# Patient Record
Sex: MALE | Race: WHITE | Employment: FULL TIME | URBAN - METROPOLITAN AREA
[De-identification: names, ages, dates, MRNs, and addresses within clinical notes are randomized per-mention and may not be internally consistent; named-entity substitution may affect disease eponyms.]

---

## 2022-02-15 ENCOUNTER — HOSPITAL ENCOUNTER (OUTPATIENT)
Dept: PHYSICAL THERAPY | Age: 67
Discharge: HOME | End: 2022-02-15
Payer: MEDICARE

## 2022-02-15 PROCEDURE — 97110 THERAPEUTIC EXERCISES: CPT

## 2022-02-15 PROCEDURE — 97161 PT EVAL LOW COMPLEX 20 MIN: CPT

## 2022-02-15 NOTE — PROGRESS NOTES
PHYSICAL THERAPY - DAILY TREATMENT NOTE    Patient Name: Saskia Medina        Date: 2/15/2022  : 1955    Patient  Verified: YES  Visit #:     Insurance: Payor: Cash Lehman / Plan: [place] MED[place] PART [place] & B / Product Type: Medicare /      In time: 3:00 Out time: 4:00   Total Treatment Time: 60     Medicare Time Tracking (below)   Total Timed Codes (min):  10 1:1 Treatment Time:  10     TREATMENT AREA/ DIAGNOSIS = Neck pain [M54.2]    SUBJECTIVE   Pain Level (on 0 to 10 scale):  1  / 10   Medication Changes/New allergies or changes in medical history, any new surgeries or procedures?     NO    If yes, update Summary List   Subjective Functional Status/Changes:  []  No changes reported     See Eval      OBJECTIVE  Modalities Rationale:     decrease inflammation and decrease pain to improve patient's ability to perform ADLs without pain   min [] Estim, type/location:                                      []  att     []  unatt     []  w/US     []  w/ice    []  w/heat    min []  Mechanical Traction: type/lbs                   []  pro   []  sup   []  int   []  cont    []  before manual    []  after manual    min []  Ultrasound, settings/location:      min []  Iontophoresis w/ dexamethasone, location:                                               []  take home patch       []  in clinic   10 min []  Ice     [x]  Heat    location/position: c/s    min []  Vasopneumatic Device, press/temp:    If using vaso (only need to measure limb vaso being performed on)      pre-treatment girth :       post-treatment girth :       measured at (landmark location) :      min []  Other:    [] Skin assessment post-treatment (if applicable):    []  intact    []  redness- no adverse reaction                  []redness  adverse reaction:        10 min Therapeutic Exercise:  [x]  See flow sheet   Rationale:      increase ROM and increase strength to improve the patients ability to perform ADLs without pain     Billed With/As:   [x] TE   [] TA   [] Neuro   [] Self Care Patient Education: [x] Review HEP    [] Progressed/Changed HEP based on:   [] positioning   [] body mechanics   [] transfers   [] heat/ice application    [] other:        Other Objective/Functional Measures:    See Eval   Post Treatment Pain Level (on 0 to 10) scale:   1  / 10     ASSESSMENT  Assessment/Changes in Function:     See Eval     []  See Progress Note/Recertification   Patient will continue to benefit from skilled PT services to modify and progress therapeutic interventions, address functional mobility deficits, address ROM deficits, address strength deficits, analyze and address soft tissue restrictions and analyze and cue movement patterns to attain remaining goals.    Progress toward goals / Updated goals:    See Eval     PLAN  [x]  Upgrade activities as tolerated YES Continue plan of care   []  Discharge due to :    []  Other:      Therapist: Rae Wright DPT     Date: 2/15/2022 Time: 3:58 PM        Future Appointments   Date Time Provider Natalie Russell   2/21/2022  5:15 PM Ivory Charity [facility]   2/23/2022  5:15 PM Ivory Charity [facility]   2/28/2022  5:15 PM Ivory Charity [facility]   3/3/2022  4:30 PM Georgianne Calandra, PTA [facility]   3/7/2022  5:15 PM Ivory Charity [facility]   3/10/2022  5:15 PM Georgianne Calandra, PTA [facility]   3/14/2022  5:15 PM Ivory Charity [facility]   3/17/2022  5:15 PM Georgianne Calandra, PTA [facility]   3/21/2022  5:15 PM Ivory Charity [facility]   3/24/2022  5:15 PM Alma Delia Feliciano PTA [facility]   3/29/2022  [facility]   3/31/2022  5:15 PM [facility]

## 2022-02-15 NOTE — PROGRESS NOTES
[facility] AT [address], [address] [place] [address], [address] [facility] [address] - Phone: [phone]  Fax: [phone] / [facility]  Patient Name: Cintia Bella : 1955   Treatment   Diagnosis: Neck pain Medical   Diagnosis: Neck pain [M54.2]   Onset Date: 2020     Referral Source: Mohan Lopez [place] [facility]): 2/15/2022   Prior Hospitalization: See medical history Provider #: [id number]   Prior Level of Function: Pt was pain free with ADLs   Comorbidities: Pt reports heart disease, diabetes, high blood pressure   Medications: Verified on Patient Summary List   The Plan of Care and following information is based on the information from the initial evaluation.   ==================================================================================  Assessment / key information:  Pt is a 78 yo male the present to PT with chief complaint of neck pain that initially began in 2020 but resolved completely after undergoing lidocaine injections. Pt reports the same pain recently occurred again in early 2022 with no known mechanism of injury. Pt .  He/she presents with pain ranging from 1-10/10, located on L side of the c/s into the L scapula. Pain is made worse with looking up and looking to the R, better with heat and rest. C/S AROM: flexion 60deg, extension 27deg, LSB 10deg, RSB 10deg, LRotation 36deg RRotation 50deg. Palpation reveals TTP to L side paraspinals, levator, and upper trap. Posture significant kyphosis and forward head posture. GHJ AROM is WFL. Significant hypomobility with PA segmental motion testing of the T/S. Patient scored 46 on FOTO indicating decreased functional activity level and QOL Pt will benefit from PT interventions to address the aforementioned deficits and allow pt to return to PLOF.   Eval Complexity: History LOW Complexity : Zero comorbidities / personal factors that will impact the outcome / POC;  Examination  LOW Complexity : 1-2 Standardized tests and measures addressing body structure, function, activity limitation and / or participation in recreation ; Presentation LOW Complexity : Stable, uncomplicated ;  Decision Making MEDIUM Complexity : FOTO score of 26-74; Overall Complexity LOW     ==================================================================================  Problem List: pain affecting function, decrease ROM, decrease strength, edema affecting function and impaired gait/ balance   Treatment Plan may include any combination of the following: Therapeutic exercise, Therapeutic activities, Neuromuscular re-education, Physical agent/modality, Manual therapy, Patient education, Self Care training and Functional mobility training  Patient / Family readiness to learn indicated by: asking questions, trying to perform skills and interest  Persons(s) to be included in education: patient (P)  Barriers to Learning/Limitations: no  Measures taken, if barriers to learning:    Patient Goal (s): \"no pain\"   Patient self reported health status: fair  Rehabilitation Potential: good   Short Term Goals: To be accomplished in  3  weeks:  1. Pt will be independent and compliant with HEP to decrease pain, increase ROM and return pt to PLOF. 2. Pt will demonstrate a GROC score of >/= +2 to show overall improvement in function  3.   Long Term Goals: To be accomplished in  6  weeks:  1. Increase score on FOTO to > or = 70 points to demo increase in functional activities. 2. Pt will demonstrate a GROC score of >/= +5 to show overall improvement in function  3. Pt will note < or = 2/10 pain with all mobility to improve comfort with ADLs.    Frequency / Duration:   Patient to be seen  2  times per week for 6-8  weeks:  Patient / Caregiver education and instruction: self care, activity modification and exercises  Therapist Signature: Laurent Evans PT Date: 4/76/7285   Certification Period: 2/15/2022-5/15/2022 Time: 3:58 PM   ===========================================================================================  I certify that the above Physical Therapy Services are being furnished while the patient is under my care. I agree with the treatment plan and certify that this therapy is necessary. Physician Signature:        Date:       Time:        Rajan Nam PA-C    Please sign and return to [facility] or you may fax the signed copy to [phone]. Thank you.

## 2022-02-23 ENCOUNTER — HOSPITAL ENCOUNTER (OUTPATIENT)
Dept: PHYSICAL THERAPY | Age: 67
Discharge: HOME | End: 2022-02-23
Payer: MEDICARE

## 2022-02-23 PROCEDURE — 97110 THERAPEUTIC EXERCISES: CPT

## 2022-02-23 PROCEDURE — 97140 MANUAL THERAPY 1/> REGIONS: CPT

## 2022-02-23 PROCEDURE — 97112 NEUROMUSCULAR REEDUCATION: CPT

## 2022-02-23 NOTE — PROGRESS NOTES
PHYSICAL THERAPY - DAILY TREATMENT NOTE    Patient Name: Pamela Mayo        Date: 2022  : 1955    Patient  Verified: YES  Visit #:   2   of   12  Insurance: Payor: Josechino Delgado / Plan: [place] MEDICARE PART A & B / Product Type: Medicare /      In time: 5:15 Out time: 6:05   Total Treatment Time: 50     Medicare Time Tracking (below)   Total Timed Codes (min):  40 1:1 Treatment Time:  40     TREATMENT AREA/ DIAGNOSIS = Neck pain [M54.2]    SUBJECTIVE   Pain Level (on 0 to 10 scale):  2  / 10   Medication Changes/New allergies or changes in medical history, any new surgeries or procedures? NO    If yes, update Summary List   Subjective Functional Status/Changes:  []  No changes reported     Pt reports having pain with performing the neck stretches on the L side of his neck.            OBJECTIVE  Modalities Rationale:     decrease pain and increase tissue extensibility to improve patient's ability to perform ADLs without pain   min [] Estim, type/location:                                      []  att     []  unatt     []  w/US     []  w/ice    []  w/heat    min []  Mechanical Traction: type/lbs                   []  pro   []  sup   []  int   []  cont    []  before manual    []  after manual    min []  Ultrasound, settings/location:      min []  Iontophoresis w/ dexamethasone, location:                                               []  take home patch       []  in clinic   10 min []  Ice     [x]  Heat    location/position: c/s    min []  Vasopneumatic Device, press/temp:    If using vaso (only need to measure limb vaso being performed on)      pre-treatment girth :       post-treatment girth :       measured at (landmark location) :      min []  Other:    [] Skin assessment post-treatment (if applicable):    []  intact    []  redness- no adverse reaction                  []redness  adverse reaction:        15 min Therapeutic Exercise:  [x]  See flow sheet   Rationale:      increase ROM and increase strength to improve the patients ability to perform ADLs without pain     10 min Manual Therapy: STM to c/s. Manual traction   Rationale:      decrease pain, increase ROM and increase tissue extensibility to improve patient's ability to perform ADLs without pain  The manual therapy interventions were performed at a separate and distinct time from the therapeutic activities interventions      15 min Neuromuscular Re-ed: [x]  See flow sheet   Rationale:    improve coordination and increase proprioception to improve the patients ability to perform ADLs without pain    Billed With/As:   [x] TE   [] TA   [] Neuro   [] Self Care Patient Education: [x] Review HEP    [] Progressed/Changed HEP based on:   [] positioning   [] body mechanics   [] transfers   [] heat/ice application    [] other:        Other Objective/Functional Measures:    Was able to find MRI results in [facility] and shows multiple disc bulges at multiple levels. Post Treatment Pain Level (on 0 to 10) scale:   2  / 10     ASSESSMENT  Assessment/Changes in Function:     Pt showing significant kyphosis and forward head posture with sitting. Difficulty with getting out of that position     []  See Progress Note/Recertification   Patient will continue to benefit from skilled PT services to modify and progress therapeutic interventions, address functional mobility deficits, address ROM deficits, address strength deficits, analyze and address soft tissue restrictions and analyze and cue movement patterns to attain remaining goals.    Progress toward goals / Updated goals:    Good Progress to    [] STG    [x] LTG  1 as shown by improved overall pain free mobility after treatment session     PLAN  [x]  Upgrade activities as tolerated YES Continue plan of care   []  Discharge due to :    []  Other:      Therapist: Marko Paredes DPT     Date: 2/23/2022 Time: 5:42 PM        Future Appointments   Date Time Provider Natalie Russell   2/28/2022  5:15 PM Vern Radha Li [facility]   3/3/2022  4:30 PM Kevin Scott, PTA [facility]   3/7/2022  5:15 PM Geraldo Augusta [facility]   3/10/2022  5:15 PM Kevin Scott, PTA [facility]   3/14/2022  5:15 PM Geraldo Augusta [facility]   3/17/2022  5:15 PM Kevin Scott, PTA [facility]   3/21/2022  5:15 PM Geraldo Augusta [facility]   3/24/2022  5:15 PM Kevin Scott, PTA [facility]   3/29/2022  5:15 PM Kevin Scott, PTA [facility]   3/31/2022  5:15 PM Kevin Scott, PTA [facility]

## 2022-02-28 ENCOUNTER — HOSPITAL ENCOUNTER (OUTPATIENT)
Dept: PHYSICAL THERAPY | Age: 67
End: 2022-02-28
Payer: MEDICARE

## 2022-03-03 ENCOUNTER — APPOINTMENT (OUTPATIENT)
Dept: PHYSICAL THERAPY | Age: 67
End: 2022-03-03

## 2022-03-07 ENCOUNTER — APPOINTMENT (OUTPATIENT)
Dept: PHYSICAL THERAPY | Age: 67
End: 2022-03-07

## 2022-03-10 ENCOUNTER — APPOINTMENT (OUTPATIENT)
Dept: PHYSICAL THERAPY | Age: 67
End: 2022-03-10

## 2022-03-14 ENCOUNTER — APPOINTMENT (OUTPATIENT)
Dept: PHYSICAL THERAPY | Age: 67
End: 2022-03-14

## 2022-03-17 ENCOUNTER — APPOINTMENT (OUTPATIENT)
Dept: PHYSICAL THERAPY | Age: 67
End: 2022-03-17

## 2022-03-21 ENCOUNTER — APPOINTMENT (OUTPATIENT)
Dept: PHYSICAL THERAPY | Age: 67
End: 2022-03-21

## 2022-03-21 NOTE — PROGRESS NOTES
[facility] AT [address] [facility], [address] [place] [address], [address], [address] [place] [address]  Phone: [phone]  Fax: [phone]  DISCHARGE SUMMARY FOR PHYSICAL THERAPY          Patient Name: Robert Suárez : 1955   Treatment/Medical Diagnosis: Neck pain [M54.2]   Onset Date: 2020    Referral Source: Thor Duverney [facility]): 2/15/2022   Prior Hospitalization: See Medical History Provider #: [id number]   Prior Level of Function: Pt was pain free with ADLs   Comorbidities: Pt reports heart disease, diabetes, high blood pressure   Medications: Verified on Patient Summary List   Visits from [facility]: 2 Missed Visits: 5       Key Functional Changes/Progress: Pt self discharged from therapy after second visit. Pt did not formally reassess goals from initial evaluation    Assessments/Recommendations: Discontinue therapy due to lack of attendance or compliance. If you have any questions/comments please contact us directly at [phone]. Thank you for allowing us to assist in the care of your patient.     Therapist Signature: Gama Holcomb Date: 3/21/2022   Reporting Period: 2/15/2022-2022 Time: 12:37 PM

## 2022-03-24 ENCOUNTER — APPOINTMENT (OUTPATIENT)
Dept: PHYSICAL THERAPY | Age: 67
End: 2022-03-24

## 2022-03-29 ENCOUNTER — APPOINTMENT (OUTPATIENT)
Dept: PHYSICAL THERAPY | Age: 67
End: 2022-03-29

## 2022-03-31 ENCOUNTER — APPOINTMENT (OUTPATIENT)
Dept: PHYSICAL THERAPY | Age: 67
End: 2022-03-31